# Patient Record
Sex: FEMALE | Race: BLACK OR AFRICAN AMERICAN | URBAN - METROPOLITAN AREA
[De-identification: names, ages, dates, MRNs, and addresses within clinical notes are randomized per-mention and may not be internally consistent; named-entity substitution may affect disease eponyms.]

---

## 2024-04-21 ENCOUNTER — HOSPITAL ENCOUNTER (EMERGENCY)
Facility: HOSPITAL | Age: 24
Discharge: HOME | End: 2024-04-21
Attending: EMERGENCY MEDICINE
Payer: COMMERCIAL

## 2024-04-21 VITALS
OXYGEN SATURATION: 100 % | SYSTOLIC BLOOD PRESSURE: 126 MMHG | BODY MASS INDEX: 24.11 KG/M2 | WEIGHT: 150 LBS | HEIGHT: 66 IN | TEMPERATURE: 98.2 F | RESPIRATION RATE: 16 BRPM | HEART RATE: 104 BPM | DIASTOLIC BLOOD PRESSURE: 84 MMHG

## 2024-04-21 DIAGNOSIS — F19.920 DRUG INTOXICATION WITHOUT COMPLICATION (MULTI): Primary | ICD-10-CM

## 2024-04-21 LAB — GLUCOSE BLD MANUAL STRIP-MCNC: 111 MG/DL (ref 74–99)

## 2024-04-21 PROCEDURE — 99284 EMERGENCY DEPT VISIT MOD MDM: CPT | Performed by: PHYSICIAN ASSISTANT

## 2024-04-21 PROCEDURE — 99283 EMERGENCY DEPT VISIT LOW MDM: CPT

## 2024-04-21 PROCEDURE — 82947 ASSAY GLUCOSE BLOOD QUANT: CPT

## 2024-04-21 PROCEDURE — 2500000005 HC RX 250 GENERAL PHARMACY W/O HCPCS: Performed by: PHYSICIAN ASSISTANT

## 2024-04-21 RX ORDER — ONDANSETRON 4 MG/1
8 TABLET, ORALLY DISINTEGRATING ORAL ONCE
Status: COMPLETED | OUTPATIENT
Start: 2024-04-21 | End: 2024-04-21

## 2024-04-21 RX ORDER — ONDANSETRON 4 MG/1
TABLET, ORALLY DISINTEGRATING ORAL
Status: DISCONTINUED
Start: 2024-04-21 | End: 2024-04-21 | Stop reason: HOSPADM

## 2024-04-21 RX ADMIN — ONDANSETRON 8 MG: 4 TABLET, ORALLY DISINTEGRATING ORAL at 02:53

## 2024-04-21 ASSESSMENT — LIFESTYLE VARIABLES
EVER FELT BAD OR GUILTY ABOUT YOUR DRINKING: NO
TOTAL SCORE: 0
EVER HAD A DRINK FIRST THING IN THE MORNING TO STEADY YOUR NERVES TO GET RID OF A HANGOVER: NO
HAVE PEOPLE ANNOYED YOU BY CRITICIZING YOUR DRINKING: NO
HAVE YOU EVER FELT YOU SHOULD CUT DOWN ON YOUR DRINKING: NO

## 2024-04-21 ASSESSMENT — PAIN SCALES - GENERAL: PAINLEVEL_OUTOF10: 0 - NO PAIN

## 2024-04-21 ASSESSMENT — COLUMBIA-SUICIDE SEVERITY RATING SCALE - C-SSRS
2. HAVE YOU ACTUALLY HAD ANY THOUGHTS OF KILLING YOURSELF?: NO
1. IN THE PAST MONTH, HAVE YOU WISHED YOU WERE DEAD OR WISHED YOU COULD GO TO SLEEP AND NOT WAKE UP?: NO
6. HAVE YOU EVER DONE ANYTHING, STARTED TO DO ANYTHING, OR PREPARED TO DO ANYTHING TO END YOUR LIFE?: NO

## 2024-04-21 ASSESSMENT — PAIN - FUNCTIONAL ASSESSMENT: PAIN_FUNCTIONAL_ASSESSMENT: 0-10

## 2024-04-21 NOTE — ED TRIAGE NOTES
Pt reports feeling sick after smoking weed for the first time. Pt vomiting all over the ground in triage.

## 2024-04-21 NOTE — PROGRESS NOTES
This patient was signed out to me from previous provider, see provider for HPI and hospital course.  In brief this is a 24-year-old female presented for nausea and vomiting after alcohol and cannabis ingestion last night.  At the time of my taking over care patient is discharged however patient not yet left the department.  Patient did ambulate comfortably without any signs of acute intoxication.  Is discharged in stable condition.  Imaging and laboratory work as seen below has been reviewed.    Labs Reviewed   POCT GLUCOSE - Abnormal       Result Value    POCT Glucose 111 (*)    POCT GLUCOSE METER     No orders to display       Diagnoses as of 04/21/24 0734   Drug intoxication without complication (Multi)

## 2024-04-21 NOTE — ED PROVIDER NOTES
HPI   Chief Complaint   Patient presents with    Nausea       HPI: Patient is a 24-year-old female who presents to the ED for nausea and 1 episode of vomiting that occurred prior to arrival.  Patient states that she ate an edible at a party and subsequently became very nauseous so she came to the ED. States that she was also drinking alcohol but did not drink very much.  States that she threw up once while in the ED.  She denies any abdominal pain, chest pain, shortness of breath, fevers, chills, diarrhea, constipation.  ------------------------------------------------------------------------------------------------------------------------------------------  ROS: a ten point review of systems was performed and was negative except as per HPI.  ------------------------------------------------------------------------------------------------------------------------------------------  PMH / PSH: as per HPI, otherwise reviewed   MEDS: as per HPI, otherwise reviewed in EMR  ALLERGIES: as per HPI, otherwise reviewed in EMR  SocH:  as per HPI, otherwise reviewed in EMR  FH:  as per HPI, otherwise reviewed in EMR   ------------------------------------------------------------------------------------------------------------------------------------------  Physical Exam:  VS: As documented in the triage note and EMR flowsheet from this visit was reviewed  General: Well appearing. No acute distress.   Eyes:  Extraocular movements grossly intact. No scleral icterus.   Head: Atraumatic. Normocephalic.     Neck: No meningismus. No gross masses. Full movement through range of motion  ENT: Posterior oropharynx shows no erythema, exudate or edema.  Uvula is midline without edema.  No stridor or trismus  CV: Regular rhythm. No murmurs, rubs, gallops appreciated.   Resp: Clear to auscultation bilaterally. No respiratory distress.    GI: Nontender. Soft. No masses. No rebound, rigidity or guarding.   MSK: Symmetric muscle bulk. No gross  step offs or deformities.  Skin: Warm, dry. No rashes  Neuro: CN II-VII intact. A&O x3. Speech fluent. Alert. Moving all extremities. Ambulates with normal gait  Psych: Appropriate mood and affect for situation  ------------------------------------------------------------------------------------------------------------------------------------------  Hospital Course / Medical Decision Making: Patient seen and discussed with Dr. Wesley. Patient is a 24-year-old female who presents to the ED for nausea and 1 episode of vomiting secondary to eating a cannabis gummy and drinking at a party prior to arrival.   Vitals are stable.  Patient administered Zofran.  Pending patient's reassessment and p.o. challenge at the end of my shift.  Patient will be signed out to the oncoming resident.  Please see their note for final results and disposition of the patient.                          Neyda Coma Scale Score: 15                     Patient History   History reviewed. No pertinent past medical history.  History reviewed. No pertinent surgical history.  No family history on file.  Social History     Tobacco Use    Smoking status: Never    Smokeless tobacco: Never   Vaping Use    Vaping status: Not on file   Substance Use Topics    Alcohol use: Never    Drug use: Yes     Types: Marijuana       Physical Exam   ED Triage Vitals [04/21/24 0125]   Temperature Heart Rate Respirations BP   36.8 °C (98.2 °F) (!) 104 16 126/84      Pulse Ox Temp Source Heart Rate Source Patient Position   100 % Temporal -- --      BP Location FiO2 (%)     -- --       Physical Exam    ED Course & [facility]        Medical Decision Making      Procedure  Procedures     Akila Davey PA-C  04/21/24 0301       Akila Daevy PA-C  04/21/24 0301

## 2024-04-21 NOTE — PROGRESS NOTES
Patient was handed off to me from the previous team. For full history, physical, and prior ED course, please see previous provider note prior to patient handoff. This is an addendum to the record.    HPI/prior hospital course:   In brief, patient is a 24-year-old who came in with nausea, vomiting and altered mental status after canal exam under wall and alcohol at a party.  Patient handed off pending sober reassessment.  Hospital Course/MDM:  On reassessment, patient not endorsing any current complaints.  Able to ambulate.  Alert and oriented x 4.  Return precautions discussed with patient and patient discharged home.    Disposition:  Discharged    Patient discussed with Dr. Lupillo Gomez MD, PhD  Emergency Medicine PGY2

## 2024-04-21 NOTE — DISCHARGE INSTRUCTIONS
We recommend avoiding edibles in the future.  Please consider return to the ED if you have any new or recurrent symptoms.